# Patient Record
Sex: FEMALE | NOT HISPANIC OR LATINO | Employment: OTHER | URBAN - METROPOLITAN AREA
[De-identification: names, ages, dates, MRNs, and addresses within clinical notes are randomized per-mention and may not be internally consistent; named-entity substitution may affect disease eponyms.]

---

## 2024-05-09 ENCOUNTER — APPOINTMENT (OUTPATIENT)
Dept: PHYSICAL THERAPY | Facility: HOSPITAL | Age: 74
End: 2024-05-09
Payer: MEDICARE

## 2024-07-18 ENCOUNTER — EVALUATION (OUTPATIENT)
Dept: PHYSICAL THERAPY | Facility: HOSPITAL | Age: 74
End: 2024-07-18
Payer: MEDICARE

## 2024-07-18 DIAGNOSIS — R29.898 DECREASED STRENGTH OF LOWER EXTREMITY: Primary | ICD-10-CM

## 2024-07-18 DIAGNOSIS — S82.091D OTHER CLOSED FRACTURE OF RIGHT PATELLA WITH ROUTINE HEALING, SUBSEQUENT ENCOUNTER: ICD-10-CM

## 2024-07-18 PROBLEM — S82.001A RIGHT PATELLA FRACTURE: Status: ACTIVE | Noted: 2024-07-18

## 2024-07-18 PROCEDURE — 97110 THERAPEUTIC EXERCISES: CPT | Mod: GP | Performed by: PHYSICAL THERAPIST

## 2024-07-18 PROCEDURE — 97161 PT EVAL LOW COMPLEX 20 MIN: CPT | Mod: GP | Performed by: PHYSICAL THERAPIST

## 2024-07-18 ASSESSMENT — ENCOUNTER SYMPTOMS
DEPRESSION: 0
LOSS OF SENSATION IN FEET: 0
OCCASIONAL FEELINGS OF UNSTEADINESS: 1

## 2024-07-18 ASSESSMENT — PATIENT HEALTH QUESTIONNAIRE - PHQ9
SUM OF ALL RESPONSES TO PHQ9 QUESTIONS 1 AND 2: 0
1. LITTLE INTEREST OR PLEASURE IN DOING THINGS: NOT AT ALL
2. FEELING DOWN, DEPRESSED OR HOPELESS: NOT AT ALL

## 2024-07-18 ASSESSMENT — PAIN - FUNCTIONAL ASSESSMENT: PAIN_FUNCTIONAL_ASSESSMENT: 0-10

## 2024-07-18 ASSESSMENT — PAIN DESCRIPTION - DESCRIPTORS: DESCRIPTORS: SHARP

## 2024-07-18 ASSESSMENT — PAIN SCALES - GENERAL: PAINLEVEL_OUTOF10: 6

## 2024-07-18 NOTE — LETTER
July 18, 2024    Marcie Saba MD  [address]  [facility]  [address]  [place] [address]    Patient: Cindy Goodwin   YOB: 1950   Date of Visit: 7/18/2024       Dear Marcie Saba MD  [address]  [facility]  [address]  [place] [address]    The attached plan of care is being sent to you because your patient’s medical reimbursement requires that you certify the plan of care. Your signature is required to allow uninterrupted insurance coverage.      You may indicate your approval by signing below and faxing this form back to us at Dept Fax: [phone].    Please call Dept: [phone] with any questions or concerns.    Thank you for this referral,        Armond Hankins, PT  [address] [id number] [phone]    Payer: Payor: [facility] MEDICARE / Plan: [employer] MEDICARE / Product Type: *No Product type* /                                                                         Date:     Dear Armond Hankins, PT,     Re: Ms. Cindy Goodwin, MRN:[id number]    I certify that I have reviewed the attached plan of care and it is medically necessary for Ms. Cindy Goodwin (1950) who is under my care.          ______________________________________                    _________________  Provider name and credentials                                           Date and time                                                                                           Plan of Care 7/18/24   Effective from: 7/18/2024  Effective to: 10/16/2024    Plan ID: [id number]            Participants as of Finalize on 7/18/2024    Name Type Comments Contact Info    Marcie Saba MD Referring Provider  [phone]    Armond Hankins, PT Physical Therapist  [phone]       Last Plan Note     Author: Armond Hankins PT Status: Incomplete Last edited: 7/18/2024  9:45 AM       Physical  Therapy    Physical Therapy Evaluation and Treatment      Patient Name: Cindy Goodwin  MRN: [id number]  : 1950   Today's Date: 2024  Time Calculation  Start Time: 945  Stop Time: 1030  Time Calculation (min): 45 min     PT Evaluation Time Entry  PT Evaluation (Low) Time Entry: 35  PT Therapeutic Procedures Time Entry  Therapeutic Exercise Time Entry: 10      Visit # 1    Assessment: Pt demonstrated a slight foot drop on her left leg and she believes she may have caught those toes when she fell on her right knee. She was issued a HEP of gentle right LE strengthening and left DF strengthening. She wishes to minimize PT visits due to a high insurance co-pay and focus on a HEP. She would benefit from 1-2 additional PT visits to review and progress HEP.  PT Assessment Results: Decreased strength, Decreased mobility, Pain  Rehab Prognosis: Excellent  Barriers to Participation: Insurance    Plan:   Treatment/Interventions: Education/ Instruction, Cryotherapy, Gait training, Manual therapy, Therapeutic exercises  PT Plan: Skilled PT  PT Frequency: Follow-up visit only  Duration: 3-4 wks  Certification Period Start Date: 07/15/24  Certification Period End Date: 07/15/25  Rehab Potential: Excellent  Plan of Care Agreement: Patient    Current Problem:   1. Decreased strength of lower extremity        2. Other closed fracture of right patella with routine healing, subsequent encounter  Referral to Physical Therapy    Follow Up In Physical Therapy          Subjective     Chief complaint: Pt fell on right knee, catching shoe on floor. Lived with the pain for about a week and then had an X-ray and found to have patellar fx. Saw ortho, given a knee brace to wear all the time. New X-ray showed good healing, told to wear brace now as needed (driving, walking a lot). Otherwise has been wearing a sleeve brace or nothing at night. Swelling seems to be going down.    Pain Better: rest, Tylenol    Pain Worse: weightbearing,  prolonged walking    Prior level of function: limited by chronic LBP    Current limitations: stairs, walking tolerance     Home setup: bedroom upstairs    Work: retired    Patient's goal: knee to heal    Precautions:  Precautions  STEADI Fall Risk Score (The score of 4 or more indicates an increased risk of falling): 8  Precautions Comment: Left foot drop    Pain:  Pain Assessment  Pain Assessment: 0-10  0-10 (Numeric) Pain Score: 6  Pain Type: Acute pain  Pain Location: Knee  Pain Orientation: Right  Pain Descriptors: Sharp  Pain Frequency: Intermittent    Objective:  Objective  Gait: decreased heel strike of *LEFT* foot    R Knee AROM: -2 deg EXT, FLEX WNL and equal charmaine    R LE strength:  Hip FLEX: 4/5  Knee EXT: 4/5  Knee FLEX: 4+/5  DF: 5/5 (3-/5 LEFT)    Flexibility:  Hamstrings: WNL  Ankle Dorsiflexion: WNL    Outcome Measures:  Other Measures  Lower Extremity Funtional Score (LEFS): 41     Treatments:  EXERCISES Date  Date  Date Date   VISIT# # # # #    REPS REPS REPS REPS          Nu-Step              Parallel bars:       Step-ups       DF stretch                     Q-hip:       TKE's              Shuttle:       DLP       SLP - R       TR                            PROM/Patella mobs                     Review HEP       Add to HEP:       TKE's       Standing Hip ABD/EXT/FF       SLS              CP prn                     HEP: Access Code: [email or web address]  URL: [email or web address]  Date: 07/18/2024  Prepared by: Neo Hankins    Exercises  - Supine Quad Set on Towel Roll  - 2 x daily - 1-2 sets - 10 reps - 5 seconds hold  - Supine Straight Leg Raises  - 2 x daily - 1-2 sets - 10 reps  - Supine Bridge  - 2 x daily - 1-2 sets - 10 reps  - Seated Ankle Dorsiflexion AROM  - 2 x daily - 2-3 sets - 10 reps  Plus Standing Heel Raises 2-3X10          Goals:  Active       Right patella fx       Right knee AROM WNL for in/out of car.        Start:  07/18/24    Expected End:  10/16/24            <=1/10  left knee pain for improved house work.        Start:  07/18/24    Expected End:  10/16/24            >=4+/5 left knee strength for reciprocal stairs.        Start:  07/18/24    Expected End:  10/16/24            Improve LEFS score >=20 points for improved mobility.        Start:  07/18/24    Expected End:  10/16/24            Independent HEP for continued gains after PT is complete.        Start:  07/18/24    Expected End:  10/16/24                       Current Participants as of 7/18/2024    Name Type Comments Contact Info    Marcie Saba MD Referring Provider  [phone]    Signature pending    Armond Hankins, PT Physical Therapist  [phone]

## 2024-07-18 NOTE — PROGRESS NOTES
Physical Therapy    Physical Therapy Evaluation and Treatment      Patient Name: Cindy Goodwin  MRN: [id number]  : 1950   Today's Date: 2024  Time Calculation  Start Time: 945  Stop Time: 0  Time Calculation (min): 45 min     PT Evaluation Time Entry  PT Evaluation (Low) Time Entry: 35  PT Therapeutic Procedures Time Entry  Therapeutic Exercise Time Entry: 10      Visit # 1    Assessment: Pt demonstrated a slight foot drop on her left leg and she believes she may have caught those toes when she fell on her right knee. She was issued a HEP of gentle right LE strengthening and left DF strengthening. She wishes to minimize PT visits due to a high insurance co-pay and focus on a HEP. She would benefit from 1-2 additional PT visits to review and progress HEP.  PT Assessment Results: Decreased strength, Decreased mobility, Pain  Rehab Prognosis: Excellent  Barriers to Participation: Insurance    Plan:   Treatment/Interventions: Education/ Instruction, Cryotherapy, Gait training, Manual therapy, Therapeutic exercises  PT Plan: Skilled PT  PT Frequency: Follow-up visit only  Duration: 3-4 wks  Certification Period Start Date: 07/15/24  Certification Period End Date: 07/15/25  Rehab Potential: Excellent  Plan of Care Agreement: Patient    Current Problem:   1. Decreased strength of lower extremity        2. Other closed fracture of right patella with routine healing, subsequent encounter  Referral to Physical Therapy    Follow Up In Physical Therapy          Subjective      Chief complaint: Pt fell on right knee, catching shoe on floor. Lived with the pain for about a week and then had an X-ray and found to have patellar fx. Saw ortho, given a knee brace to wear all the time. New X-ray showed good healing, told to wear brace now as needed (driving, walking a lot). Otherwise has been wearing a sleeve brace or nothing at night. Swelling seems to be going down.    Pain Better: rest, Tylenol    Pain Worse:  weightbearing, prolonged walking    Prior level of function: limited by chronic LBP    Current limitations: stairs, walking tolerance     Home setup: bedroom upstairs    Work: retired    Patient's goal: knee to heal    Precautions:  Precautions  STEADI Fall Risk Score (The score of 4 or more indicates an increased risk of falling): 8  Precautions Comment: Left foot drop    Pain:  Pain Assessment  Pain Assessment: 0-10  0-10 (Numeric) Pain Score: 6  Pain Type: Acute pain  Pain Location: Knee  Pain Orientation: Right  Pain Descriptors: Sharp  Pain Frequency: Intermittent    Objective:  Objective   Gait: decreased heel strike of *LEFT* foot    R Knee AROM: -2 deg EXT, FLEX WNL and equal charmaine    R LE strength:  Hip FLEX: 4/5  Knee EXT: 4/5  Knee FLEX: 4+/5  DF: 5/5 (3-/5 LEFT)    Flexibility:  Hamstrings: WNL  Ankle Dorsiflexion: WNL    Outcome Measures:  Other Measures  Lower Extremity Funtional Score (LEFS): 41     Treatments:  EXERCISES Date  Date  Date Date   VISIT# # # # #    REPS REPS REPS REPS          Nu-Step              Parallel bars:       Step-ups       DF stretch                     Q-hip:       TKE's              Shuttle:       DLP       SLP - R       TR                            PROM/Patella mobs                     Review HEP       Add to HEP:       TKE's       Standing Hip ABD/EXT/FF       SLS              CP prn                     HEP: Access Code: [email or web address]  URL: [email or web address]  Date: 07/18/2024  Prepared by: Neo Hankins    Exercises  - Supine Quad Set on Towel Roll  - 2 x daily - 1-2 sets - 10 reps - 5 seconds hold  - Supine Straight Leg Raises  - 2 x daily - 1-2 sets - 10 reps  - Supine Bridge  - 2 x daily - 1-2 sets - 10 reps  - Seated Ankle Dorsiflexion AROM  - 2 x daily - 2-3 sets - 10 reps  Plus Standing Heel Raises 2-3X10          Goals:  Active       Right patella fx       Right knee AROM WNL for in/out of car.        Start:  07/18/24    Expected End:  10/16/24             <=1/10 left knee pain for improved house work.        Start:  07/18/24    Expected End:  10/16/24            >=4+/5 left knee strength for reciprocal stairs.        Start:  07/18/24    Expected End:  10/16/24            Improve LEFS score >=20 points for improved mobility.        Start:  07/18/24    Expected End:  10/16/24            Independent HEP for continued gains after PT is complete.        Start:  07/18/24    Expected End:  10/16/24

## 2024-08-05 ENCOUNTER — APPOINTMENT (OUTPATIENT)
Dept: PHYSICAL THERAPY | Facility: HOSPITAL | Age: 74
End: 2024-08-05
Payer: MEDICARE

## 2024-08-05 ENCOUNTER — DOCUMENTATION (OUTPATIENT)
Dept: PHYSICAL THERAPY | Facility: HOSPITAL | Age: 74
End: 2024-08-05
Payer: MEDICARE

## 2024-08-05 NOTE — PROGRESS NOTES
Physical Therapy                 Therapy Communication Note    Patient Name: Cindy Goodwin  MRN: [id number]  : 1950   Today's Date: 2024     Discipline: Physical Therapy    Missed Time: Cancel    Comment: Pt is not feeling well.   cervical spine/location

## 2024-08-07 ENCOUNTER — APPOINTMENT (OUTPATIENT)
Dept: PHYSICAL THERAPY | Facility: HOSPITAL | Age: 74
End: 2024-08-07
Payer: MEDICARE

## 2024-08-19 ENCOUNTER — TREATMENT (OUTPATIENT)
Dept: PHYSICAL THERAPY | Facility: HOSPITAL | Age: 74
End: 2024-08-19
Payer: MEDICARE

## 2024-08-19 DIAGNOSIS — S82.091D OTHER CLOSED FRACTURE OF RIGHT PATELLA WITH ROUTINE HEALING, SUBSEQUENT ENCOUNTER: ICD-10-CM

## 2024-08-19 DIAGNOSIS — R29.898 DECREASED STRENGTH OF LOWER EXTREMITY: Primary | ICD-10-CM

## 2024-08-19 PROCEDURE — 97110 THERAPEUTIC EXERCISES: CPT | Mod: GP | Performed by: PHYSICAL THERAPIST

## 2024-08-19 ASSESSMENT — PAIN SCALES - GENERAL: PAINLEVEL_OUTOF10: 2

## 2024-08-19 ASSESSMENT — PAIN - FUNCTIONAL ASSESSMENT: PAIN_FUNCTIONAL_ASSESSMENT: 0-10

## 2024-08-19 NOTE — PROGRESS NOTES
"Physical Therapy    Physical Therapy Treatment / Discharge    Patient Name: Cindy Goodwin  MRN: [id number]  : 1950   Today's Date: 2024  Time Calculation  Start Time: 1430  Stop Time: 1515  Time Calculation (min): 45 min       PT Therapeutic Procedures Time Entry  Therapeutic Exercise Time Entry: 45         Visit #2    Assessment:   Pt needs encouragement with HEP compliance. She needed slight verbal cueing with HEP. She demonstrated an understanding of additional HEP for LE strengthening and balance. She wishes this to be the last visit due to high insurance co-pay.     Plan:   Discharge to independent [facility], asked to call with questions or concerns.     Current Problem  1. Decreased strength of lower extremity        2. Other closed fracture of right patella with routine healing, subsequent encounter  Follow Up In Physical Therapy          Subjective   General  Pt states she has done her HEP \"some\".    Precautions  Precautions  STEADI Fall Risk Score (The score of 4 or more indicates an increased risk of falling): 8  Precautions Comment: Left foot drop    Pain  Pain Assessment: 0-10  0-10 (Numeric) Pain Score: 2  Pain Location: Knee  Pain Orientation: Right    Objective       Treatments:  EXERCISES Date 2024  Date  Date Date   VISIT# #2 # # #    REPS REPS REPS REPS          Nu-Step              Parallel bars:       Step-ups       DF stretch                     Q-hip:       TKE's              Shuttle:       DLP       SLP - R       TR                            PROM/Patella mobs                     Review HEP 20'      Add to HEP:       TKE's       Standing Hip ABD/EXT/FF 2x10 ea (no EXT)      SLS 30\" x2 charmaine      Seated knee EXT stretch              CP prn                     HEP: Access Code: [email or web address]  URL: [email or web address]  Date: 2024  Prepared by: Neo Hankins    Exercises  - Supine Quad Set on Towel Roll  - 2 x daily - 1-2 sets - 10 reps - 5 seconds hold  - Supine " Straight Leg Raises  - 2 x daily - 1-2 sets - 10 reps  - Supine Bridge  - 2 x daily - 1-2 sets - 10 reps  - Seated Ankle Dorsiflexion AROM  - 2 x daily - 2-3 sets - 10 reps  Plus Standing Heel Raises 2-3X10 Access Code: [email or web address]  URL: [email or web address]  Date: 08/19/2024  Prepared by: Neo Hankins    Exercises  - Standing Hip Abduction with Counter Support  - 2 x daily - 1 sets - 10 reps  - Standing March with Counter Support  - 2 x daily - 1 sets - 10 reps  - Standing Single Leg Stance with Counter Support  - 2 x daily - 1-2 reps - 30-60 seconds hold  - Seated Knee Extension Stretch with Chair  - 2 x daily - 1 sets - 2-3 minutes hold        Goals:  Resolved       Right patella fx       Right knee AROM WNL for in/out of car.  (Adequate for Discharge)       Start:  07/18/24    Expected End:  10/16/24    Resolved:  08/19/24         <=1/10 left knee pain for improved house work.  (Adequate for Discharge)       Start:  07/18/24    Expected End:  10/16/24    Resolved:  08/19/24         >=4+/5 left knee strength for reciprocal stairs.  (Adequate for Discharge)       Start:  07/18/24    Expected End:  10/16/24    Resolved:  08/19/24         Improve LEFS score >=20 points for improved mobility.  (Adequate for Discharge)       Start:  07/18/24    Expected End:  10/16/24    Resolved:  08/19/24         Independent HEP for continued gains after PT is complete.  (Adequate for Discharge)       Start:  07/18/24    Expected End:  10/16/24    Resolved:  08/19/24

## 2024-08-26 ENCOUNTER — EVALUATION (OUTPATIENT)
Dept: PHYSICAL THERAPY | Facility: HOSPITAL | Age: 74
End: 2024-08-26
Payer: MEDICARE

## 2024-08-26 DIAGNOSIS — R26.81 UNSTEADINESS: Primary | ICD-10-CM

## 2024-08-26 DIAGNOSIS — R53.1 WEAKNESS: ICD-10-CM

## 2024-08-26 DIAGNOSIS — R26.9 UNSPECIFIED ABNORMALITIES OF GAIT AND MOBILITY: ICD-10-CM

## 2024-08-26 DIAGNOSIS — Z91.81 HISTORY OF FALLING: ICD-10-CM

## 2024-08-26 PROCEDURE — 97110 THERAPEUTIC EXERCISES: CPT | Mod: GP | Performed by: PHYSICAL THERAPIST

## 2024-08-26 PROCEDURE — 97161 PT EVAL LOW COMPLEX 20 MIN: CPT | Mod: GP | Performed by: PHYSICAL THERAPIST

## 2024-08-26 ASSESSMENT — PAIN - FUNCTIONAL ASSESSMENT: PAIN_FUNCTIONAL_ASSESSMENT: 0-10

## 2024-08-26 ASSESSMENT — PAIN SCALES - GENERAL: PAINLEVEL_OUTOF10: 6

## 2024-08-26 NOTE — PROGRESS NOTES
Physical Therapy    Physical Therapy Evaluation and Treatment      Patient Name: Cindy Goodwin  MRN: [id number]  : 1950   Today's Date: 2024  Time Calculation  Start Time: 1030  Stop Time: 1130  Time Calculation (min): 60 min     PT Evaluation Time Entry  PT Evaluation (Low) Time Entry: 45  PT Therapeutic Procedures Time Entry  Therapeutic Exercise Time Entry: 15      Visit # 1    Assessment: Pt may have fallen recently due to her left foot drop. She was issued info on a DF strap which she may eventually look into. Treatment will include a focus on lumbar stenosis, LE strengthening and balance retraining.   PT Assessment Results: Decreased strength, Impaired balance, Decreased mobility  Rehab Prognosis: Good  Barriers to Participation: Insurance    Plan: Pt wishes to focus on HEP and come to PT only 2-3 times due to high insurance co-pay.  Treatment/Interventions: Education/ Instruction, Gait training, Neuromuscular re-education, Therapeutic exercises  PT Plan: Skilled PT  PT Frequency: 1 time per week  Duration: 3-4 wks  Certification Period Start Date: 24  Certification Period End Date: 25  Rehab Potential: Good  Plan of Care Agreement: Patient    Current Problem:   1. Unsteadiness        2. Unspecified abnormalities of gait and mobility  Referral to Physical Therapy    Follow Up In Physical Therapy      3. Weakness  Referral to Physical Therapy    Follow Up In Physical Therapy      4. History of falling  Referral to Physical Therapy    Follow Up In Physical Therapy          Subjective      Chief complaint: Pt states she feels like she is unsteady on her feet, feels like she is pulled to one side. Fell once in  when she thinks she caught her left foot, fracturing her right patella (just completed PT for this). Started to feel her foot slap when she was walking a few months ago. Has a Hx lumbar stenosis/chronic LBP, better since losing weight. Most concerned with her balance. Denies pain  or numbness/tingling into left LE, just the foot drop. Use of cane in the community. Denies room spinning or dizziness but does report feeling her disequilibrium is off.     Pain Better: rest    Pain Worse: Walking    Imaging: None    Prior level of function: independent     Current limitations: walking    Home setup: Lives with , 1-story living    Work: Retired    Patient's goal: be more stable    Precautions:  Precautions  Precautions Comment: Left foot drop    Pain:  Pain Assessment  Pain Assessment: 0-10  0-10 (Numeric) Pain Score: 6  Pain Location: Back  Pain Orientation: Right, Left, Mid    Objective:  Objective   Gait: flexed/rotated trunk with use of cane    Posture: chronic flexed spine, right shift and rotation/scoliosis    Lumbar ROM  FLEX: WNL  EXT: less than neutral   SB R: -50%  SB L: -50%    LE strength:  Grossly WNL except L DF 4-/5    Tightness with SKTC and DKTC stretching charmaine    Balance screen:  Feet together, eyes closed with sway  Compliant surface, eyes closed with sway  No LOB with testing    Outcome Measures:  Other Measures  Activities - Specific Balance Confidence Scale: 30     Treatments:  EXERCISES Date  Date  Date Date   VISIT# # # # #    REPS REPS REPS REPS   Nu-Step              Parallel bars:       DF stretch       Lateral walking       Marching       Bkwds walking       Walk with head turns horiz/vert       Foam:        Marches       Head turns horiz/vert       Reaching                            Shuttle:        DLP       SLP       TR              T-band hip ABD/ER                            HEP: Access Code: [email or web address]  URL: [email or web address]  Date: 08/26/2024  Prepared by: Neo Hankins    Exercises  - Supine Single Knee to Chest  - 2 x daily - 3 sets - 30 seconds hold  - Supine Double Knee to Chest  - 2 x daily - 3 sets - 30 seconds hold  - Supine Lower Trunk Rotation  - 2 x daily - 3 reps - 30 hold  - Supine Posterior Pelvic Tilt  - 2 x daily - 1-2  sets - 10 reps - 5 hold  - Standing Tandem Balance with Counter Support  - 2 x daily - 1-2 minutes hold          Goals:  Active       Imbalance       Improve ABC score >=30 points to avoid falls.        Start:  08/26/24    Expected End:  11/24/24            Pt will utilize a left dorsiflexion strap or brace to improve foot clearance with gait to avoid falls.        Start:  08/26/24    Expected End:  11/24/24            Improve walking tolerance to >=30 minutes for grocery shopping.        Start:  08/26/24    Expected End:  11/24/24            Independent HEP for continued gains after PT is complete.        Start:  08/26/24    Expected End:  11/24/24

## 2024-08-26 NOTE — LETTER
2024    Toña Stallings DO  [address] [place] [address]    Patient: Cindy Goodwin   YOB: 1950   Date of Visit: 2024       Dear Toña Stallings DO  [address] [place] [address]    The attached plan of care is being sent to you because your patient’s medical reimbursement requires that you certify the plan of care. Your signature is required to allow uninterrupted insurance coverage.      You may indicate your approval by signing below and faxing this form back to us at Dept Fax: [phone].    Please call Dept: [phone] with any questions or concerns.    Thank you for this referral,        Armond Hankins, PT  [address] [address] [phone]    Payer: Payor: [employer] / [facility] / Product Type: *No Product type* /                                                                         Date:     Dear Armond Hankins, PT,     Re: Ms. Cindy Goodwin, MRN:[id number]    I certify that I have reviewed the attached plan of care and it is medically necessary for Ms. Cindy Goodwin (1950) who is under my care.          ______________________________________                    _________________  Provider name and credentials                                           Date and time                                                                                           Plan of Care 24   Effective from: 2024  Effective to: 2024    Plan ID: [id number]            Participants as of Finalize on 2024    Name Type Comments Contact Info    Toña Stallings DO PCP - General  [phone]    Armond Hankins, PT Physical Therapist  [phone]       Last Plan Note     Author: Armond Hankins PT Status: Incomplete Last edited: 2024 10:30 AM       Physical Therapy    Physical Therapy Evaluation and Treatment      Patient Name: Cindy Goodwin  MRN: [id number]  : 1950    Today's Date: 8/26/2024  Time Calculation  Start Time: 1030  Stop Time: 1130  Time Calculation (min): 60 min     PT Evaluation Time Entry  PT Evaluation (Low) Time Entry: 45  PT Therapeutic Procedures Time Entry  Therapeutic Exercise Time Entry: 15      Visit # 1    Assessment: Pt may have fallen recently due to her left foot drop. She was issued info on a DF strap which she may eventually look into. Treatment will include a focus on lumbar stenosis, LE strengthening and balance retraining.   PT Assessment Results: Decreased strength, Impaired balance, Decreased mobility  Rehab Prognosis: Good  Barriers to Participation: Insurance    Plan: Pt wishes to focus on HEP and come to PT only 2-3 times due to high insurance co-pay.  Treatment/Interventions: Education/ Instruction, Gait training, Neuromuscular re-education, Therapeutic exercises  PT Plan: Skilled PT  PT Frequency: 1 time per week  Duration: 3-4 wks  Certification Period Start Date: 04/22/24  Certification Period End Date: 04/22/25  Rehab Potential: Good  Plan of Care Agreement: Patient    Current Problem:   1. Unsteadiness        2. Unspecified abnormalities of gait and mobility  Referral to Physical Therapy    Follow Up In Physical Therapy      3. Weakness  Referral to Physical Therapy    Follow Up In Physical Therapy      4. History of falling  Referral to Physical Therapy    Follow Up In Physical Therapy          Subjective     Chief complaint: Pt states she feels like she is unsteady on her feet, feels like she is pulled to one side. Fell once in June when she thinks she caught her left foot, fracturing her right patella (just completed PT for this). Started to feel her foot slap when she was walking a few months ago. Has a Hx lumbar stenosis/chronic LBP, better since losing weight. Most concerned with her balance. Denies pain or numbness/tingling into left LE, just the foot drop. Use of cane in the community. Denies room spinning or dizziness but does  report feeling her disequilibrium is off.     Pain Better: rest    Pain Worse: Walking    Imaging: None    Prior level of function: independent     Current limitations: walking    Home setup: Lives with , 1-story living    Work: Retired    Patient's goal: be more stable    Precautions:  Precautions  Precautions Comment: Left foot drop    Pain:  Pain Assessment  Pain Assessment: 0-10  0-10 (Numeric) Pain Score: 6  Pain Location: Back  Pain Orientation: Right, Left, Mid    Objective:  Objective  Gait: flexed/rotated trunk with use of cane    Posture: chronic flexed spine, right shift and rotation/scoliosis    Lumbar ROM  FLEX: WNL  EXT: less than neutral   SB R: -50%  SB L: -50%    LE strength:  Grossly WNL except L DF 4-/5    Tightness with SKTC and DKTC stretching charmaine    Balance screen:  Feet together, eyes closed with sway  Compliant surface, eyes closed with sway  No LOB with testing    Outcome Measures:  Other Measures  Activities - Specific Balance Confidence Scale: 30     Treatments:  EXERCISES Date  Date  Date Date   VISIT# # # # #    REPS REPS REPS REPS   Nu-Step              Parallel bars:       DF stretch       Lateral walking       Marching       Bkwds walking       Walk with head turns horiz/vert       Foam:        Marches       Head turns horiz/vert       Reaching                            Shuttle:        DLP       SLP       TR              T-band hip ABD/ER                            HEP: Access Code: [email or web address]  URL: [email or web address]  Date: 08/26/2024  Prepared by: Neo Hankins    Exercises  - Supine Single Knee to Chest  - 2 x daily - 3 sets - 30 seconds hold  - Supine Double Knee to Chest  - 2 x daily - 3 sets - 30 seconds hold  - Supine Lower Trunk Rotation  - 2 x daily - 3 reps - 30 hold  - Supine Posterior Pelvic Tilt  - 2 x daily - 1-2 sets - 10 reps - 5 hold  - Standing Tandem Balance with Counter Support  - 2 x daily - 1-2 minutes hold          Goals:  Active        Imbalance       Improve ABC score >=30 points to avoid falls.        Start:  08/26/24    Expected End:  11/24/24            Pt will utilize a left dorsiflexion strap or brace to improve foot clearance with gait to avoid falls.        Start:  08/26/24    Expected End:  11/24/24            Improve walking tolerance to >=30 minutes for grocery shopping.        Start:  08/26/24    Expected End:  11/24/24            Independent HEP for continued gains after PT is complete.        Start:  08/26/24    Expected End:  11/24/24                   Current Participants as of 8/26/2024    Name Type Comments Contact Info    Toña Stallings DO PCP - General  [phone]    Signature pending    Armond Hankins, PT Physical Therapist  [phone]

## 2024-09-06 ENCOUNTER — APPOINTMENT (OUTPATIENT)
Dept: PHYSICAL THERAPY | Facility: HOSPITAL | Age: 74
End: 2024-09-06
Payer: MEDICARE

## 2024-09-12 ENCOUNTER — TREATMENT (OUTPATIENT)
Dept: PHYSICAL THERAPY | Facility: HOSPITAL | Age: 74
End: 2024-09-12
Payer: MEDICARE

## 2024-09-12 DIAGNOSIS — R26.81 UNSTEADINESS: Primary | ICD-10-CM

## 2024-09-12 DIAGNOSIS — Z91.81 HISTORY OF FALLING: ICD-10-CM

## 2024-09-12 DIAGNOSIS — R26.9 UNSPECIFIED ABNORMALITIES OF GAIT AND MOBILITY: ICD-10-CM

## 2024-09-12 DIAGNOSIS — R53.1 WEAKNESS: ICD-10-CM

## 2024-09-12 PROCEDURE — 97110 THERAPEUTIC EXERCISES: CPT | Mod: GP | Performed by: PHYSICAL THERAPIST

## 2024-09-12 ASSESSMENT — PAIN - FUNCTIONAL ASSESSMENT: PAIN_FUNCTIONAL_ASSESSMENT: 0-10

## 2024-09-12 ASSESSMENT — PAIN SCALES - GENERAL: PAINLEVEL_OUTOF10: 0 - NO PAIN

## 2024-09-12 NOTE — PROGRESS NOTES
"Physical Therapy    Physical Therapy Treatment    Patient Name: Cindy Goodwin  MRN: [id number]  : 1950   Today's Date: 2024  Time Calculation  Start Time: 1115  Stop Time: 1200  Time Calculation (min): 45 min       PT Therapeutic Procedures Time Entry  Therapeutic Exercise Time Entry: 45         Visit #2    Assessment:   Pt needs some verbal cueing for HEP. She is able to demonstrate understanding after review. Difficulty getting hip flexors stretched and she was unable to tolerate the shuttle due to lumbar discomfort.     Pt reports 0/10 pain after treatment.    Plan:   Add backwards walking.     Current Problem  1. Unsteadiness        2. Unspecified abnormalities of gait and mobility  Follow Up In Physical Therapy      3. Weakness  Follow Up In Physical Therapy      4. History of falling  Follow Up In Physical Therapy          Subjective   General  Pt states she's not sure she's feeling the stretching in the right places. Admits to less than full HEP compliance.     Precautions  Precautions  STEADI Fall Risk Score (The score of 4 or more indicates an increased risk of falling): 8  Precautions Comment: Left foot drop    Pain  Pain Assessment: 0-10  0-10 (Numeric) Pain Score: 0 - No pain    Objective     Treatments:  EXERCISES Date 2024  Date  Date Date   VISIT# #2 # # #    REPS REPS REPS REPS   Nu-Step              Parallel bars:       DF stretch 3x30\"      Lateral walking 2 laps      Marching 2 laps      Bkwds walking       Walk with head turns horiz/vert 2 laps      Standing hip flexor stretch - charmaine 30\" L only             Foam:        Marches       Head turns horiz/vert 2x10 ea      Reaching                            Shuttle:        DLP Unable      SLP       TR              T-band hip ABD/ER                      Review HEP - 20'      HEP: Access Code: [email or web address]  URL: [email or web address]  Date: 2024  Prepared by: Neo Hankins    Exercises  - Supine Single Knee to Chest "  - 2 x daily - 3 sets - 30 seconds hold  - Supine Double Knee to Chest  - 2 x daily - 3 sets - 30 seconds hold  - Supine Lower Trunk Rotation  - 2 x daily - 3 reps - 30 hold  - Supine Posterior Pelvic Tilt  - 2 x daily - 1-2 sets - 10 reps - 5 hold  - Standing Tandem Balance with Counter Support  - 2 x daily - 1-2 minutes hold         Goals:  Active       Imbalance       Improve ABC score >=30 points to avoid falls.        Start:  08/26/24    Expected End:  11/24/24            Pt will utilize a left dorsiflexion strap or brace to improve foot clearance with gait to avoid falls.        Start:  08/26/24    Expected End:  11/24/24            Improve walking tolerance to >=30 minutes for grocery shopping.        Start:  08/26/24    Expected End:  11/24/24            Independent HEP for continued gains after PT is complete.        Start:  08/26/24    Expected End:  11/24/24

## 2024-09-26 ENCOUNTER — APPOINTMENT (OUTPATIENT)
Dept: PHYSICAL THERAPY | Facility: HOSPITAL | Age: 74
End: 2024-09-26
Payer: MEDICARE

## 2024-10-10 ENCOUNTER — APPOINTMENT (OUTPATIENT)
Dept: PHYSICAL THERAPY | Facility: HOSPITAL | Age: 74
End: 2024-10-10
Payer: MEDICARE

## 2024-10-10 DIAGNOSIS — R26.81 UNSTEADINESS: Primary | ICD-10-CM

## 2024-10-10 DIAGNOSIS — R26.9 UNSPECIFIED ABNORMALITIES OF GAIT AND MOBILITY: ICD-10-CM

## 2024-10-10 DIAGNOSIS — Z91.81 HISTORY OF FALLING: ICD-10-CM

## 2024-10-10 DIAGNOSIS — R53.1 WEAKNESS: ICD-10-CM

## 2024-10-10 PROCEDURE — 97110 THERAPEUTIC EXERCISES: CPT | Mod: GP | Performed by: PHYSICAL THERAPIST

## 2024-10-10 ASSESSMENT — PAIN SCALES - GENERAL: PAINLEVEL_OUTOF10: 0 - NO PAIN

## 2024-10-10 ASSESSMENT — PAIN - FUNCTIONAL ASSESSMENT: PAIN_FUNCTIONAL_ASSESSMENT: 0-10

## 2024-10-10 NOTE — PROGRESS NOTES
Physical Therapy    Physical Therapy Treatment / Discharge    Patient Name: Cindy Goodwin  MRN: [id number]  : 1950   Today's Date: 10/10/2024  Time Calculation  Start Time: 1300  Stop Time: 1340  Time Calculation (min): 40 min       PT Therapeutic Procedures Time Entry  Therapeutic Exercise Time Entry: 40       Visit #3    Assessment:   Pt was encouraged to look into a dorsiflexion brace for her left foot drop which will likely help her gait and balance. She wishes to continue independently with HEP due to high insurance co-pay.    Pt reports 0/10 pain after treatment.    Plan:   Discharge to HEP.    Current Problem  1. Unsteadiness        2. Unspecified abnormalities of gait and mobility  Follow Up In Physical Therapy      3. Weakness  Follow Up In Physical Therapy      4. History of falling  Follow Up In Physical Therapy          Subjective   General  Pt states she has not noticed much of a change in her steadiness. No falls but has occasionally caught her left foot when walking. Reports less than full compliance with HEP.    Precautions  Precautions  STEADI Fall Risk Score (The score of 4 or more indicates an increased risk of falling): 8  Precautions Comment: Left foot drop    Pain  Pain Assessment: 0-10  0-10 (Numeric) Pain Score: 0 - No pain    Objective   Gait: flexed/rotated trunk with use of cane     Posture: chronic flexed spine, right shift and rotation/scoliosis    Lumbar ROM  FLEX: WNL  EXT: less than neutral   SB R: -50%  SB L: -50%    LE strength:  Grossly WNL except L DF, PF, INV and EV: 4-/5    Tightness with SKTC and DKTC stretching charmaine    Balance screen:  Feet together, eyes closed with sway  Modified tandem stance with LOB    Outcome Measures:  Other Measures  Activities - Specific Balance Confidence Scale: 40.63 (up from 30)    Treatments:  EXERCISES Date 2024  Date 10/10/2024  Date Date   VISIT# #2 #3 # #    REPS REPS REPS REPS   Nu-Step              Parallel bars:       DF stretch  "3x30\"      Lateral walking 2 laps      Marching 2 laps      Bkwds walking       Walk with head turns horiz/vert 2 laps      Standing hip flexor stretch - [place] 30\" L only             Foam:        Marches       Head turns horiz/vert 2x10 ea      Reaching                            Shuttle:        DLP Unable      SLP       TR              T-band hip ABD/ER                      Review HEP - 20' Reassess and review/progress HEP, DF strap education - 40 min      HEP: Access Code: [email or web address]  URL: [email or web address]  Date: 08/26/2024  Prepared by: Neo Hankins    Exercises  - Supine Single Knee to Chest  - 2 x daily - 3 sets - 30 seconds hold  - Supine Double Knee to Chest  - 2 x daily - 3 sets - 30 seconds hold  - Supine Lower Trunk Rotation  - 2 x daily - 3 reps - 30 hold  - Supine Posterior Pelvic Tilt  - 2 x daily - 1-2 sets - 10 reps - 5 hold  - Standing Tandem Balance with Counter Support  - 2 x daily - 1-2 minutes hold         Goals:  Resolved       Imbalance       Improve ABC score >=30 points to avoid falls.  (Adequate for Discharge)       Start:  08/26/24    Expected End:  11/24/24    Resolved:  10/10/24         Pt will utilize a left dorsiflexion strap or brace to improve foot clearance with gait to avoid falls.  (Adequate for Discharge)       Start:  08/26/24    Expected End:  11/24/24    Resolved:  10/10/24         Improve walking tolerance to >=30 minutes for grocery shopping.  (Met)       Start:  08/26/24    Expected End:  11/24/24    Resolved:  10/10/24         Independent HEP for continued gains after PT is complete.  (Met)       Start:  08/26/24    Expected End:  11/24/24    Resolved:  10/10/24            "

## 2025-08-18 ENCOUNTER — EVALUATION (OUTPATIENT)
Dept: PHYSICAL THERAPY | Facility: HOSPITAL | Age: 75
End: 2025-08-18
Payer: MEDICARE

## 2025-08-18 DIAGNOSIS — R26.81 UNSTEADINESS: ICD-10-CM

## 2025-08-18 DIAGNOSIS — R26.9 UNSPECIFIED ABNORMALITIES OF GAIT AND MOBILITY: Primary | ICD-10-CM

## 2025-08-18 DIAGNOSIS — R53.1 WEAKNESS: ICD-10-CM

## 2025-08-18 PROCEDURE — 97530 THERAPEUTIC ACTIVITIES: CPT | Mod: GP | Performed by: PHYSICAL THERAPIST

## 2025-08-18 PROCEDURE — 97163 PT EVAL HIGH COMPLEX 45 MIN: CPT | Mod: GP | Performed by: PHYSICAL THERAPIST

## 2025-08-25 ENCOUNTER — TREATMENT (OUTPATIENT)
Dept: PHYSICAL THERAPY | Facility: HOSPITAL | Age: 75
End: 2025-08-25
Payer: MEDICARE

## 2025-08-25 DIAGNOSIS — R53.1 WEAKNESS: ICD-10-CM

## 2025-08-25 DIAGNOSIS — R26.81 UNSTEADINESS: ICD-10-CM

## 2025-08-25 DIAGNOSIS — R26.9 UNSPECIFIED ABNORMALITIES OF GAIT AND MOBILITY: ICD-10-CM

## 2025-08-25 PROCEDURE — 97110 THERAPEUTIC EXERCISES: CPT | Mod: GP,CQ

## 2025-08-29 ENCOUNTER — TREATMENT (OUTPATIENT)
Dept: PHYSICAL THERAPY | Facility: HOSPITAL | Age: 75
End: 2025-08-29
Payer: MEDICARE

## 2025-08-29 DIAGNOSIS — R26.9 UNSPECIFIED ABNORMALITIES OF GAIT AND MOBILITY: ICD-10-CM

## 2025-08-29 DIAGNOSIS — R53.1 WEAKNESS: ICD-10-CM

## 2025-08-29 DIAGNOSIS — R26.81 UNSTEADINESS: ICD-10-CM

## 2025-08-29 PROCEDURE — 97110 THERAPEUTIC EXERCISES: CPT | Mod: GP,CQ

## 2025-09-02 ENCOUNTER — TREATMENT (OUTPATIENT)
Dept: PHYSICAL THERAPY | Facility: HOSPITAL | Age: 75
End: 2025-09-02
Payer: MEDICARE

## 2025-09-02 DIAGNOSIS — R53.1 WEAKNESS: ICD-10-CM

## 2025-09-02 DIAGNOSIS — R26.9 UNSPECIFIED ABNORMALITIES OF GAIT AND MOBILITY: ICD-10-CM

## 2025-09-02 DIAGNOSIS — R26.81 UNSTEADINESS: ICD-10-CM

## 2025-09-02 PROCEDURE — 97110 THERAPEUTIC EXERCISES: CPT | Mod: GP,CQ

## 2025-09-05 ENCOUNTER — TREATMENT (OUTPATIENT)
Dept: PHYSICAL THERAPY | Facility: HOSPITAL | Age: 75
End: 2025-09-05
Payer: MEDICARE

## 2025-09-05 DIAGNOSIS — R26.81 UNSTEADINESS: ICD-10-CM

## 2025-09-05 DIAGNOSIS — R53.1 WEAKNESS: ICD-10-CM

## 2025-09-05 DIAGNOSIS — R26.9 UNSPECIFIED ABNORMALITIES OF GAIT AND MOBILITY: ICD-10-CM

## 2025-09-05 PROCEDURE — 97112 NEUROMUSCULAR REEDUCATION: CPT | Mod: GP | Performed by: PHYSICAL THERAPIST

## 2025-09-05 PROCEDURE — 97110 THERAPEUTIC EXERCISES: CPT | Mod: GP | Performed by: PHYSICAL THERAPIST
